# Patient Record
Sex: FEMALE
[De-identification: names, ages, dates, MRNs, and addresses within clinical notes are randomized per-mention and may not be internally consistent; named-entity substitution may affect disease eponyms.]

---

## 2021-05-29 ENCOUNTER — NURSE TRIAGE (OUTPATIENT)
Dept: OTHER | Facility: CLINIC | Age: 26
End: 2021-05-29

## 2021-05-29 NOTE — TELEPHONE ENCOUNTER
Reason for Disposition   MODERATE-SEVERE widespread itching (i.e., interferes with sleep, normal activities or school) and not improved after 24 hours of itching Care Advice    Answer Assessment - Initial Assessment Questions  1. DESCRIPTION: \"Describe the itching you are having. \"      Wakes up itching, no rash. Thinks it is related to her liver. 2. SEVERITY: \"How bad is it? \"     - MILD - doesn't interfere with normal activities    - MODERATE-SEVERE: interferes with work, school, sleep, or other activities       Moderate, keeping her from sleeping at night. 3. SCRATCHING: \"Are there any scratch marks? Bleeding? \"      No     4. ONSET: \"When did this begin? \"       A couple days ago     5. CAUSE: \"What do you think is causing the itching? \" (ask about swimming pools, pollen, animals, soaps, etc.)      Liver issues, got blood work recently. 6. OTHER SYMPTOMS: \"Do you have any other symptoms? \"       Difficulty sleeping, main concern it itching, depression. 7. PREGNANCY: \"Is there any chance you are pregnant? \" \"When was your last menstrual period? \"      No    Protocols used: ITCHING - WIDESPREAD-ADULT-[place]    Brief description of triage: widespread itching, no rash; believes it is due to her liver. Triage indicates for patient to talk with a PCP today. I recommended [facility] and help finding a PCP. Caller politely refused these options at this time. Care advice provided, patient verbalizes understanding; denies any other questions or concerns; instructed to call back for any new or worsening symptoms. This triage is a result of a call to [address] [place] [address]. Please do not respond to the triage nurse through this encounter. Any subsequent communication should be directly with the patient.